# Patient Record
Sex: FEMALE | Race: WHITE | NOT HISPANIC OR LATINO
[De-identification: names, ages, dates, MRNs, and addresses within clinical notes are randomized per-mention and may not be internally consistent; named-entity substitution may affect disease eponyms.]

---

## 2018-04-19 LAB
ABORH_EXT (HISTORICAL CONVERSION): NORMAL
ABORH_EXT (HISTORICAL CONVERSION): NORMAL
ANTIBODY_EXT (HISTORICAL CONVERSION): NEGATIVE
HBSAG_EXT (HISTORICAL CONVERSION): NORMAL
HIV_EXT: NORMAL
RUBELLA_EXT (HISTORICAL CONVERSION): NORMAL

## 2018-06-26 ENCOUNTER — RECORDS - HEALTHEAST (OUTPATIENT)
Dept: ADMINISTRATIVE | Facility: OTHER | Age: 33
End: 2018-06-26

## 2018-06-26 LAB
HGB_EXT (HISTORICAL CONVERSION): 12.6
PLT_EXT - HISTORICAL: 211
RPR - HISTORICAL: NORMAL

## 2018-07-03 ENCOUNTER — RECORDS - HEALTHEAST (OUTPATIENT)
Dept: ADMINISTRATIVE | Facility: OTHER | Age: 33
End: 2018-07-03

## 2018-07-06 ENCOUNTER — RECORDS - HEALTHEAST (OUTPATIENT)
Dept: ADMINISTRATIVE | Facility: OTHER | Age: 33
End: 2018-07-06

## 2018-07-09 ENCOUNTER — COMMUNICATION - HEALTHEAST (OUTPATIENT)
Dept: ADMINISTRATIVE | Facility: CLINIC | Age: 33
End: 2018-07-09

## 2018-07-24 ENCOUNTER — OFFICE VISIT - HEALTHEAST (OUTPATIENT)
Dept: EDUCATION SERVICES | Facility: CLINIC | Age: 33
End: 2018-07-24

## 2018-07-24 DIAGNOSIS — O24.410 DIET CONTROLLED GESTATIONAL DIABETES MELLITUS (GDM) IN THIRD TRIMESTER: ICD-10-CM

## 2018-07-31 ENCOUNTER — AMBULATORY - HEALTHEAST (OUTPATIENT)
Dept: EDUCATION SERVICES | Facility: CLINIC | Age: 33
End: 2018-07-31

## 2018-07-31 DIAGNOSIS — O24.410 DIET CONTROLLED GESTATIONAL DIABETES MELLITUS (GDM) IN THIRD TRIMESTER: ICD-10-CM

## 2018-08-06 ENCOUNTER — AMBULATORY - HEALTHEAST (OUTPATIENT)
Dept: EDUCATION SERVICES | Facility: CLINIC | Age: 33
End: 2018-08-06

## 2018-08-06 DIAGNOSIS — O24.414 INSULIN CONTROLLED GESTATIONAL DIABETES MELLITUS (GDM) IN THIRD TRIMESTER: ICD-10-CM

## 2018-08-07 ENCOUNTER — COMMUNICATION - HEALTHEAST (OUTPATIENT)
Dept: ENDOCRINOLOGY | Facility: CLINIC | Age: 33
End: 2018-08-07

## 2018-08-07 DIAGNOSIS — O24.419 GESTATIONAL DIABETES: ICD-10-CM

## 2018-08-16 ENCOUNTER — OFFICE VISIT - HEALTHEAST (OUTPATIENT)
Dept: ENDOCRINOLOGY | Facility: CLINIC | Age: 33
End: 2018-08-16

## 2018-08-16 DIAGNOSIS — O24.414 INSULIN CONTROLLED GESTATIONAL DIABETES MELLITUS (GDM) IN THIRD TRIMESTER: ICD-10-CM

## 2018-08-30 ENCOUNTER — OFFICE VISIT - HEALTHEAST (OUTPATIENT)
Dept: ENDOCRINOLOGY | Facility: CLINIC | Age: 33
End: 2018-08-30

## 2018-08-30 DIAGNOSIS — O24.414 INSULIN CONTROLLED GESTATIONAL DIABETES MELLITUS (GDM) IN THIRD TRIMESTER: ICD-10-CM

## 2018-08-30 LAB — GBS_EXT (HISTORICAL CONVERSION): POSITIVE

## 2018-09-04 ENCOUNTER — HOSPITAL ENCOUNTER (OUTPATIENT)
Dept: OBGYN | Facility: HOSPITAL | Age: 33
Discharge: HOME | End: 2018-09-04
Attending: OBSTETRICS & GYNECOLOGY | Admitting: OBSTETRICS & GYNECOLOGY

## 2018-09-04 LAB
ALBUMIN UR-MCNC: NEGATIVE MG/DL
AMORPH CRY #/AREA URNS HPF: ABNORMAL /[HPF]
APPEARANCE UR: ABNORMAL
BACTERIA #/AREA URNS HPF: ABNORMAL HPF
BILIRUB UR QL STRIP: NEGATIVE
COLOR UR AUTO: YELLOW
GLUCOSE BLDC GLUCOMTR-MCNC: 154 MG/DL
GLUCOSE UR STRIP-MCNC: ABNORMAL MG/DL
HGB UR QL STRIP: NEGATIVE
KETONES UR STRIP-MCNC: NEGATIVE MG/DL
LEUKOCYTE ESTERASE UR QL STRIP: NEGATIVE
MUCOUS THREADS #/AREA URNS LPF: ABNORMAL LPF
NITRATE UR QL: NEGATIVE
PH UR STRIP: 7 [PH] (ref 4.5–8)
RBC #/AREA URNS AUTO: ABNORMAL HPF
RUPTURE OF FETAL MEMBRANES BY ROM PLUS: NEGATIVE
SP GR UR STRIP: 1.01 (ref 1–1.03)
SQUAMOUS #/AREA URNS AUTO: ABNORMAL LPF
UROBILINOGEN UR STRIP-ACNC: ABNORMAL
WBC #/AREA URNS AUTO: ABNORMAL HPF
WBC CLUMPS #/AREA URNS HPF: PRESENT /[HPF]

## 2018-09-04 ASSESSMENT — MIFFLIN-ST. JEOR: SCORE: 1740.08

## 2019-11-28 ENCOUNTER — RECORDS - HEALTHEAST (OUTPATIENT)
Dept: ADMINISTRATIVE | Facility: OTHER | Age: 34
End: 2019-11-28

## 2019-11-28 LAB — HBA1C MFR BLD: 5.2 % (ref 0–5.6)

## 2020-04-30 ENCOUNTER — RECORDS - HEALTHEAST (OUTPATIENT)
Dept: ADMINISTRATIVE | Facility: OTHER | Age: 35
End: 2020-04-30

## 2020-05-01 ENCOUNTER — RECORDS - HEALTHEAST (OUTPATIENT)
Dept: ADMINISTRATIVE | Facility: OTHER | Age: 35
End: 2020-05-01

## 2020-05-04 ENCOUNTER — COMMUNICATION - HEALTHEAST (OUTPATIENT)
Dept: ADMINISTRATIVE | Facility: CLINIC | Age: 35
End: 2020-05-04

## 2020-05-08 ENCOUNTER — RECORDS - HEALTHEAST (OUTPATIENT)
Dept: HEALTH INFORMATION MANAGEMENT | Facility: CLINIC | Age: 35
End: 2020-05-08

## 2020-05-12 ENCOUNTER — AMBULATORY - HEALTHEAST (OUTPATIENT)
Dept: EDUCATION SERVICES | Facility: CLINIC | Age: 35
End: 2020-05-12

## 2020-05-12 DIAGNOSIS — O24.419 GESTATIONAL DIABETES: ICD-10-CM

## 2020-05-14 ENCOUNTER — COMMUNICATION - HEALTHEAST (OUTPATIENT)
Dept: ADMINISTRATIVE | Facility: CLINIC | Age: 35
End: 2020-05-14

## 2020-05-14 DIAGNOSIS — O24.419 GESTATIONAL DIABETES: ICD-10-CM

## 2020-05-14 RX ORDER — GLUCOSAMINE HCL/CHONDROITIN SU 500-400 MG
1 CAPSULE ORAL 4 TIMES DAILY
Qty: 150 STRIP | Refills: 3 | Status: SHIPPED | OUTPATIENT
Start: 2020-05-14

## 2020-05-18 ENCOUNTER — AMBULATORY - HEALTHEAST (OUTPATIENT)
Dept: EDUCATION SERVICES | Facility: CLINIC | Age: 35
End: 2020-05-18

## 2020-05-18 ENCOUNTER — COMMUNICATION - HEALTHEAST (OUTPATIENT)
Dept: ADMINISTRATIVE | Facility: CLINIC | Age: 35
End: 2020-05-18

## 2020-05-18 DIAGNOSIS — O24.414 INSULIN CONTROLLED GESTATIONAL DIABETES MELLITUS (GDM) IN THIRD TRIMESTER: ICD-10-CM

## 2020-05-27 ENCOUNTER — AMBULATORY - HEALTHEAST (OUTPATIENT)
Dept: EDUCATION SERVICES | Facility: CLINIC | Age: 35
End: 2020-05-27

## 2020-05-27 ENCOUNTER — OFFICE VISIT - HEALTHEAST (OUTPATIENT)
Dept: ENDOCRINOLOGY | Facility: CLINIC | Age: 35
End: 2020-05-27

## 2020-05-27 DIAGNOSIS — O24.414 INSULIN CONTROLLED GESTATIONAL DIABETES MELLITUS (GDM) IN THIRD TRIMESTER: ICD-10-CM

## 2020-06-07 ENCOUNTER — COMMUNICATION - HEALTHEAST (OUTPATIENT)
Dept: SCHEDULING | Facility: CLINIC | Age: 35
End: 2020-06-07

## 2020-06-08 ENCOUNTER — AMBULATORY - HEALTHEAST (OUTPATIENT)
Dept: ENDOCRINOLOGY | Facility: CLINIC | Age: 35
End: 2020-06-08

## 2020-06-08 DIAGNOSIS — O24.414 INSULIN CONTROLLED GESTATIONAL DIABETES MELLITUS (GDM) IN THIRD TRIMESTER: ICD-10-CM

## 2020-06-08 RX ORDER — INSULIN DETEMIR 100 [IU]/ML
INJECTION, SOLUTION SUBCUTANEOUS
Qty: 15 ML | Refills: 5 | Status: SHIPPED | OUTPATIENT
Start: 2020-06-08

## 2020-06-10 ENCOUNTER — OFFICE VISIT - HEALTHEAST (OUTPATIENT)
Dept: ENDOCRINOLOGY | Facility: CLINIC | Age: 35
End: 2020-06-10

## 2020-06-10 DIAGNOSIS — O24.414 INSULIN CONTROLLED GESTATIONAL DIABETES MELLITUS (GDM) IN THIRD TRIMESTER: ICD-10-CM

## 2020-06-24 ENCOUNTER — OFFICE VISIT - HEALTHEAST (OUTPATIENT)
Dept: ENDOCRINOLOGY | Facility: CLINIC | Age: 35
End: 2020-06-24

## 2020-06-24 ENCOUNTER — AMBULATORY - HEALTHEAST (OUTPATIENT)
Dept: ENDOCRINOLOGY | Facility: CLINIC | Age: 35
End: 2020-06-24

## 2020-06-24 DIAGNOSIS — O24.414 INSULIN CONTROLLED GESTATIONAL DIABETES MELLITUS (GDM) IN THIRD TRIMESTER: ICD-10-CM

## 2020-06-30 ENCOUNTER — AMBULATORY - HEALTHEAST (OUTPATIENT)
Dept: OBGYN | Facility: CLINIC | Age: 35
End: 2020-06-30

## 2020-06-30 DIAGNOSIS — Z3A.37 37 WEEKS GESTATION OF PREGNANCY: ICD-10-CM

## 2020-07-01 ENCOUNTER — AMBULATORY - HEALTHEAST (OUTPATIENT)
Dept: FAMILY MEDICINE | Facility: CLINIC | Age: 35
End: 2020-07-01

## 2020-07-01 DIAGNOSIS — Z3A.37 37 WEEKS GESTATION OF PREGNANCY: ICD-10-CM

## 2020-07-03 ASSESSMENT — MIFFLIN-ST. JEOR: SCORE: 1780.9

## 2020-07-04 ENCOUNTER — SURGERY - HEALTHEAST (OUTPATIENT)
Dept: SURGERY | Facility: HOSPITAL | Age: 35
End: 2020-07-04

## 2020-07-04 ENCOUNTER — ANESTHESIA - HEALTHEAST (OUTPATIENT)
Dept: SURGERY | Facility: HOSPITAL | Age: 35
End: 2020-07-04

## 2021-06-01 ENCOUNTER — RECORDS - HEALTHEAST (OUTPATIENT)
Dept: ADMINISTRATIVE | Facility: CLINIC | Age: 36
End: 2021-06-01

## 2021-06-01 VITALS — WEIGHT: 226.3 LBS

## 2021-06-01 VITALS — WEIGHT: 224.6 LBS

## 2021-06-01 VITALS — WEIGHT: 225.7 LBS

## 2021-06-01 VITALS — WEIGHT: 224.9 LBS

## 2021-06-02 VITALS — BODY MASS INDEX: 37.99 KG/M2 | HEIGHT: 65 IN | WEIGHT: 228 LBS

## 2021-06-02 VITALS — WEIGHT: 229 LBS

## 2021-06-04 VITALS — HEIGHT: 65 IN | BODY MASS INDEX: 39.49 KG/M2 | WEIGHT: 237 LBS

## 2021-06-07 NOTE — TELEPHONE ENCOUNTER
[facility] - [place] Women s care   Referring Provider: Lamar Mireles  DX: GDM     Ref./rec. Were received on... (5/1/2020 3:36pm inside DM Fax Folder) records need to be printed.

## 2021-06-07 NOTE — TELEPHONE ENCOUNTER
Date: 5/12/2020 Status: Charla   Time: 2:00 PM Length: 60   Visit Type: TELEPHONE VISIT NEW [2801] Copay: $0.00   Provider: Ketty Waite, Diabetes Ed Department: [facility] DIABETES EDUCATION   Referring Provider: HECTOR JARAMILLO CSN: [id number]   Notes: Phone, call: [phone], Referring Provider: Lamar Mireles  DX: GDM      If any RXs are to be sent, pharmacy on file has been confirmed with patient. Please call pt to confirm if anything is sent over.

## 2021-06-08 NOTE — TELEPHONE ENCOUNTER
I called LM for the pt to c/b to inform the RX has been adjusted. Please inform the pt when she calls back.

## 2021-06-08 NOTE — PROGRESS NOTES
"Mary Dudley is a 34 y.o. female who is being evaluated via a billable video visit.      The patient has been notified of following:     \"This video visit will be conducted via a call between you and your physician/provider. We have found that certain health care needs can be provided without the need for an in-person physical exam.  This service lets us provide the care you need with a video conversation.  If a prescription is necessary we can send it directly to your pharmacy.  If lab work is needed we can place an order for that and you can then stop by our lab to have the test done at a later time.    Video visits are billed at different rates depending on your insurance coverage. Please reach out to your insurance provider with any questions.    If during the course of the call the physician/provider feels a video visit is not appropriate, you will not be charged for this service.\"    Patient has given verbal consent to a Video visit? Yes    Patient would like to receive their AVS by AVS Preference: Evelyn.    Patient would like the video invitation sent by: Text to cell phone: [phone]    Will anyone else be joining your video visit? No        Video Start Time: 1332    Additional provider notes:     [facility]  ENDOCRINOLOGY    Gestational Diabetes 2020    Mary Dudley, 1985, [id number]          Reason for visit      1. Insulin controlled gestational diabetes mellitus (GDM) in third trimester        HPI     Mary Dudley is a very pleasant 34 y.o. old female who presents for GESTATIONAL Diabetes Mellitus.  She is currently 32w2d  pregnant . Due date is 2020   Diagnosed with GDM based on an OGTT. She hashad  GDM in prior pregnancies. Last baby weighed 7Lb 9 oz  Current carbohydrate intake:consistent with recommendations of 30g-60g-60g.  I have reviewed her blood glucose logs and note that the:  Fasting readings  are:above range on current regimen  Postprandial readings are:in range " on current regimen  Current Levemir dose: 14  Current Prandial insulin:0   Blood glucose logs/meter brought in and data reviewed and incorporated into decision-making.  Planned delivery at: [facility]  OBGYN: Azeem    Therapy/Interventions in the past:  She has been seen by the Diabetes Educator- and has received instruction on carbohydrate counting and  consistency.  Records from referring provider and other sources have also been reviewed and incorporated into decision-making.      TODAY:    Laina is being contacted today via Video Visit after starting insulin for GDM. We are joined by SHERIN Centeno.  This is not her first [place].  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 Of the numbers that she has provided, her FBS continue to be elevated. Her PP readings are good. We will increase her dosage by 4 units this evening. Baby is moving appropriately and she is having no swelling.      Blood Sugar Readings:    5-20  F 107    5-21  F 102    5-22  F 95  B 111  D 112    5-23  F 114  B 111    5-24  F 108  L 121    5-25  F 97    5-26  F 103  L 116    5-27  F 106  B 126      Past Medical History       Patient Active Problem List   Diagnosis     Anxiety state     Blood type, Rh negative     Cannabis dependence in remission (H)     Cervical cancer screening     Depressive disorder     Need for rhogam due to Rh negative mother     Obesity in pregnancy     Tobacco use complicating pregnancy        Past Surgical History     No past surgical history on file.    Family History     No family history on file.    Social History     Social History     Tobacco Use     Smoking status: Former Smoker     Smokeless tobacco: Current User   Substance Use Topics     Alcohol use: No     Drug use: No       Review of Systems     Patient has no polyuria or polydipsia, no chest pain, dyspnea or TIA's, no numbness, tingling or pain in extremities  Remainder  negative except as noted in HPI.      Vital Signs     There were no vitals taken for this visit.  Wt Readings from Last 3 Encounters:   18 (!) 228 lb (103.4 kg)   18 (!) 228 lb (103.4 kg)   18 (!) 229 lb (103.9 kg)           Assessment     1. Insulin controlled gestational diabetes mellitus (GDM) in third trimester        Plan     1. GESTATIONAL DIABETES-  Adjust dose as follows:    -Levemir insulin 18   units. Increase by 2 units every 2 days to keep fasting blood glucose below 95mg/dL  -Novolog 0  units with breakfast  -Novolog 0 units with lunch   -Novolog 0 units with dinner  -Increase by 2 units every 2 days to keep 1 hour after meal blood glucose less than 140mg/dL    We reviewed glucose goals of fasting blood glucose <95 mg/dL and 1 hour post prandial blood glucose of <140 mg/dL.    Monitor blood sugar 4 times daily: Fasting  and 1 hour after each meal.  Contact  this clinic [phone] if blood glucose is not within the above-mentioned goals.     We discussed the importance of excellent glycemic control during pregnancy to limit complications such as fetal macrosomia, shoulder dystocia,  hypoglycemia and hyperbilirubinemia.  I have discussed the patient's increased risk of recurrent GDM and/or development of type 2 diabetes later in life.        Pt will f/u in 2 weeks.           Lab Results     Hemoglobin A1c_EXT   Date Value Ref Range Status   2019 5.2 <5.7 % Final       No results found for: CHOL, HDL, LDLCALC, TRIG    No results found for: ALT, AST, GGT, ALKPHOS, BILITOT      Current Medications     Outpatient Medications Prior to Visit   Medication Sig Dispense Refill     acetone, urine, test Strp Test once a day 100 strip 0     blood glucose test (ACCU-CHEK GUIDE) strips Use 1 each As Directed 4 (four) times a day. 150 strip 3     blood-glucose meter Misc Please dispense Accu Check Guide meter and testing supplies or any meter + testing supplies per insurance formualry  "1 each 0     generic lancets Use 1 each As Directed 3 (three) times a day. 100 each 6     insulin detemir U-100 (LEVEMIR FLEXTOUCH U-100 INSULN) 100 unit/mL (3 mL) pen Inject 8 Units under the skin at bedtime. 5 adj dose pen 1     pen needle, diabetic (BD ULTRA-FINE JOIE PEN NEEDLE) 32 gauge x 5/32\" Ndle Use 1 each As Directed daily. 100 each 2     PNV no.95/ferrous fum/folic ac (PRENATAL ORAL) Take by mouth.       clindamycin (CLEOCIN T) 1 % lotion Apply topically.       docusate sodium (COLACE) 50 MG capsule Take 50 mg by mouth.       hydrocortisone 1 % cream Apply to inflamed skin 2-3 times daily until clear but not more than 2 weeks       No facility-administered medications prior to visit.            Video-Visit Details    Type of service:  Video Visit    Video End Time (time video stopped): 1350  Originating Location (pt. Location): Home    Distant Location (provider location):  [place] [facility]     Platform used for Video Visit: Tessa VELEZ      "

## 2021-06-08 NOTE — TELEPHONE ENCOUNTER
Pt states that pharm told her there is still a problem. (not sure if they got the notice about RX instructions being change, etc.)

## 2021-06-08 NOTE — TELEPHONE ENCOUNTER
"Patient calling reporting she is unable to refill her Levemir Insulin due to current dosing higher then prescribed 8 units  as insurance will not cover her.   Patient reporting she is currently taking 28 units at bedtime and still moving up dosage at this time. Stating pharmacy is advising listing 28 units as dose with a maximum dose.     Attempted to contact [employer] [facility] to request Endocrine on call and was advised no one is listed on call. Patient will contact OB/GYN provider on call now and/or pay for a dose out of pocket for this evening.     Patient will contact diabetic education clinic in morning. Denies further symptoms.      Laina Johnston RN  [facility]    Reason for Disposition    [1] Request for URGENT new prescription or refill of \"essential\" medication (i.e., likelihood of harm to patient if not taken) AND [2] triager unable to fill per unit policy    Additional Information    Negative: Drug overdose and nurse unable to answer question    Negative: Caller requesting information not related to medicine    Negative: Caller requesting a prescription for Strep throat and has a positive culture result    Negative: Rash while taking a medication or within 3 days of stopping it    Negative: Immunization reaction suspected    Negative: [1] Asthma and [2] having symptoms of asthma (cough, wheezing, etc)    Negative: MORE THAN A DOUBLE DOSE of a prescription or over-the-counter (OTC) drug    Negative: [1] DOUBLE DOSE (an extra dose or lesser amount) of over-the-counter (OTC) drug AND [2] any symptoms (e.g., dizziness, nausea, pain, sleepiness)    Negative: [1] DOUBLE DOSE (an extra dose or lesser amount) of prescription drug AND [2] any symptoms (e.g., dizziness, nausea, pain, sleepiness)    Negative: Took another person's prescription drug    Protocols used: MEDICATION QUESTION CALL-A-      "

## 2021-06-08 NOTE — PROGRESS NOTES
"Mary Dudley is a 34 y.o. female who is being evaluated via a billable video visit.      The patient has been notified of following:     \"This video visit will be conducted via a call between you and your physician/provider. We have found that certain health care needs can be provided without the need for an in-person physical exam.  This service lets us provide the care you need with a video conversation.  If a prescription is necessary we can send it directly to your pharmacy.  If lab work is needed we can place an order for that and you can then stop by our lab to have the test done at a later time.    Video visits are billed at different rates depending on your insurance coverage. Please reach out to your insurance provider with any questions.    If during the course of the call the physician/provider feels a video visit is not appropriate, you will not be charged for this service.\"    Patient has given verbal consent to a Video visit? Yes    Patient would like to receive their AVS by AVS Preference: Evelyn.    Patient would like the video invitation sent by: Text to cell phone: [phone]    Will anyone else be joining your video visit? No        Video Start Time: 1318    Additional provider notes:      Reason for visit      1. Insulin controlled gestational diabetes mellitus (GDM) in third trimester        HPI     Mary Dduley is a very pleasant 34 y.o. old female who presents for GESTATIONAL Diabetes Mellitus.  She is currently 32w2d  pregnant . Due date is 2020   Diagnosed with GDM based on an OGTT. She hashad  GDM in prior pregnancies. Last baby weighed 7Lb 9 oz  Current carbohydrate intake:consistent with recommendations of 30g-60g-60g.  I have reviewed her blood glucose logs and note that the:  Fasting readings  are:above range on current regimen  Postprandial readings are:in range on current regimen  Current Levemir dose: 32  Current Prandial insulin:0   Blood glucose logs/meter brought " in and data reviewed and incorporated into decision-making.  Planned delivery at: [facility]  OBGYN: Azeem    Therapy/Interventions in the past:  She has been seen by the Diabetes Educator- and has received instruction on carbohydrate counting and  consistency.  Records from referring provider and other sources have also been reviewed and incorporated into decision-making.      TODAY:  Mary is contacted today via Video Visit in f/u for GDM. We are joined by SHERIN Palomino. She has provided her BG for us and her FBS continue to be quite elevated. We will increase by 6 units tonight. She is having difficulty eating dinner earlier enough in the evening and it often falls between 8 and 10 PM. If she eats late, she won't have a snack. If she is working, she will get up around 5 in the morning. Less sleep is also contributory to problems with FBS control. Baby is moving appropriately and is passing NSTs. Pt is having some swelling in her L ankle only, but has a hx of injury in it.     Blood Sugar Readings:    6-3  F 99  B 113    6-4  F 114  B 115  L 127  D 145    6-5  F 101  L 134  D 185    6-6  F 99  L 70  D 183    6-7  F 103  B 103  L 88  D 94    6-8  F 103  B 131  L 125    6-9  F 107  L 153  D 119    6-10  F 114  B 111    Ketones for this week: negative        Past Medical History       Patient Active Problem List   Diagnosis     Anxiety state     Blood type, Rh negative     Cannabis dependence in remission (H)     Cervical cancer screening     Depressive disorder     Need for rhogam due to Rh negative mother     Obesity in pregnancy     Tobacco use complicating pregnancy        Past Surgical History     No past surgical history on file.    Family History     No family history on file.    Social History     Social History     Tobacco Use     Smoking status: Former Smoker     Smokeless tobacco: Current User   Substance Use Topics     Alcohol use: No     Drug use: No       Review of Systems     Patient has no polyuria  or polydipsia, no chest pain, dyspnea or TIA's, no numbness, tingling or pain in extremities  Remainder negative except as noted in HPI.      Vital Signs     There were no vitals taken for this visit.  Wt Readings from Last 3 Encounters:   18 (!) 228 lb (103.4 kg)   18 (!) 228 lb (103.4 kg)   18 (!) 229 lb (103.9 kg)           Assessment     1. Insulin controlled gestational diabetes mellitus (GDM) in third trimester        Plan     1. GESTATIONAL DIABETES-  Adjust dose as follows:     -Levemir insulin 38   units. Increase by 2 units every 2 days to keep fasting blood glucose below 95mg/dL  -Novolog 0  units with breakfast  -Novolog 0 units with lunch   -Novolog 0 units with dinner  -Increase by 2 units every 2 days to keep 1 hour after meal blood glucose less than 140mg/dL     We reviewed glucose goals of fasting blood glucose <95 mg/dL and 1 hour post prandial blood glucose of <140 mg/dL.    Monitor blood sugar 4 times daily: Fasting  and 1 hour after each meal.  Contact  this clinic [phone] if blood glucose is not within the above-mentioned goals.      We discussed the importance of excellent glycemic control during pregnancy to limit complications such as fetal macrosomia, shoulder dystocia,  hypoglycemia and hyperbilirubinemia.  I have discussed the patient's increased risk of recurrent GDM and/or development of type 2 diabetes later in life.          Pt will f/u in 1 weeks.             Lab Results     Hemoglobin A1c_EXT   Date Value Ref Range Status   2019 5.2 <5.7 % Final       No results found for: CHOL, HDL, LDLCALC, TRIG    No results found for: ALT, AST, GGT, ALKPHOS, BILITOT      Current Medications     Outpatient Medications Prior to Visit   Medication Sig Dispense Refill     acetone, urine, test Strp Test once a day 100 strip 0     blood glucose test (ACCU-CHEK GUIDE) strips Use 1 each As Directed 4 (four) times a day. 150 strip 3     blood-glucose meter Misc  "Please dispense Accu Check Guide meter and testing supplies or any meter + testing supplies per insurance formualry 1 each 0     generic lancets Use 1 each As Directed 3 (three) times a day. 100 each 6     insulin detemir U-100 (LEVEMIR FLEXTOUCH U-100 INSULN) 100 unit/mL (3 mL) pen Take as directed (titrating), up to 50 units a day 15 mL 5     pen needle, diabetic (BD ULTRA-FINE JOIE PEN NEEDLE) 32 gauge x 5/32\" Ndle Use 1 each As Directed daily. 100 each 2     PNV no.95/ferrous fum/folic ac (PRENATAL ORAL) Take by mouth.       No facility-administered medications prior to visit.        Video-Visit Details    Type of service:  Video Visit    Video End Time (time video stopped): 1336  Originating Location (pt. Location): Home    Distant Location (provider location):  [place] [facility]     Platform used for Video Visit: Tessa VELEZ      "

## 2021-06-08 NOTE — PROGRESS NOTES
"RANJIT GDM Care Plan      Assessment/Plan: telephone visit today. Spoke with pt over the phone. She is up to 14 units at HS. Will increase to 18 units tonight and continue to increase by +2 every 2 FBG >95. Reviewed signs and symptoms of hypoglycemia and treatment. 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 She will work on getting more pp readings.   Pt will call with any concerns prior to f/u.     5-20  F 107     5-21  F 102     5-22  F 95  B 111  D 112     5-23  F 114  B 111     5-24  F 108  L 121     5-25  F 97     5-26  F 103  L 116     5-27  F 106  B 126      Time: 20  Visit Type: telephone visit: pregnancy clinic   Provider: Liz Gann CNM   Provider's Diagnosis (per referral form): Gestational (648.83)  OGTT: No results found for this or any previous visit.   Estimated Date of Delivery: 7/20  Pregnancy #: 4  Previous GDM: Yes   Medications:   Current Outpatient Medications:      acetone, urine, test Strp, Test once a day, Disp: 100 strip, Rfl: 0     blood glucose test (ACCU-CHEK GUIDE) strips, Use 1 each As Directed 4 (four) times a day., Disp: 150 strip, Rfl: 3     blood-glucose meter Misc, Please dispense Accu Check Guide meter and testing supplies or any meter + testing supplies per insurance formualry, Disp: 1 each, Rfl: 0     clindamycin (CLEOCIN T) 1 % lotion, Apply topically., Disp: , Rfl:      docusate sodium (COLACE) 50 MG capsule, Take 50 mg by mouth., Disp: , Rfl:      generic lancets, Use 1 each As Directed 3 (three) times a day., Disp: 100 each, Rfl: 6     hydrocortisone 1 % cream, Apply to inflamed skin 2-3 times daily until clear but not more than 2 weeks, Disp: , Rfl:      insulin detemir U-100 (LEVEMIR FLEXTOUCH U-100 INSULN) 100 unit/mL (3 mL) pen, Inject 8 Units under the skin at bedtime., Disp: 5 adj dose pen, Rfl: 1     pen needle, diabetic (BD ULTRA-FINE JOIE PEN NEEDLE) 32 gauge x 5/32\" Ndle, Use 1 each As Directed daily., Disp: " 100 each, Rfl: 2      BG monitoring goals: Fasting <95; 1 hour post start of meal <140. Test 4 x per day.  Check fasting a.m. ketones: Yes  GDM meal pattern/carb counting taught per guidelines: Yes    Past Goals:  Nutrition: GDM meal plan MET  Activity: Walking after meals when able/staying active MET  Monitoring: BG 4x/day as directed, ketones every morning MET      New Goals:  Nutrition: GDM meal plan   Activity: Walking after meals when able/staying active   Monitoring: BG 4x/day as directed, ketones every morning    DIABETES CARE PLAN AND EDUCATION RECORD    Gestational Diabetes Disease Process/Preconception Care/Management During Pregnancy/Postpartum:Discussed    Meter (per above goals): Discussed    Nutrition Management    Weight: Assessed and Discussed  Portions/Balance: Assessed and Discussed  Carb ID/Count: Assessed and Discussed  Label Reading: Assessed and Discussed  Menu Planning: Assessed and Discussed  Dining Out: Assessed and Discussed  Physical Activity: Assessed and Discussed    Acute Complications: Prevent, Detect, Treat:    Goal Setting and Problem Solving: Assessed and Discussed  Barriers: Assessed and Discussed  Psychosocial Adjustments: Assessed and Discussed

## 2021-06-08 NOTE — TELEPHONE ENCOUNTER
I called the pharmacy and advised that they will need to do a dose over-ride for the pt. They will do this and get the RX ready for the pt. Pt informed.

## 2021-06-08 NOTE — TELEPHONE ENCOUNTER
Diabetes educators    Patient states she received all RXs but:blood glucose test (ACCU-CHEK GUIDE) strips     Please resend RX or check with pharm on why it isnt there.    Please send to: [id number] - [place] - [address] AT [place] [id number]

## 2021-06-08 NOTE — TELEPHONE ENCOUNTER
Gestational patient calling with 3 high fasting readings. Please advise on what to do.     Mary @ [phone]

## 2021-06-08 NOTE — TELEPHONE ENCOUNTER
Returned call to patient regarding high BG   Fasting numbers have been 107, 105, 96, 113, 97  Based upon GDM policy and protocol for high fasting BG in pregnancy insulin is indicated. Patient was then instructed to    begin with 8 units  SC at HS and increase by 2 units every other day until fasting numbers are <95 mg/dL - then remain at that dose  Patient stated she used insulin with her 2 previous pregnancies so felt comfortable with the administration.  Will have scheduling contact her to get her into PC  Used Levemir with last pregnancy

## 2021-06-09 NOTE — PROGRESS NOTES
"Mary Dudley is a 34 y.o. female who is being evaluated via a billable video visit.      The patient has been notified of following:     \"This video visit will be conducted via a call between you and your physician/provider. We have found that certain health care needs can be provided without the need for an in-person physical exam.  This service lets us provide the care you need with a video conversation.  If a prescription is necessary we can send it directly to your pharmacy.  If lab work is needed we can place an order for that and you can then stop by our lab to have the test done at a later time.    Video visits are billed at different rates depending on your insurance coverage. Please reach out to your insurance provider with any questions.    If during the course of the call the physician/provider feels a video visit is not appropriate, you will not be charged for this service.\"    Patient has given verbal consent to a Video visit? Yes    How would you like to obtain your AVS? AVS Preference: [employer].  Patient would like the video invitation sent by: phone    Will anyone else be joining your video visit? No        Video Start Time: 1342    Additional provider notes:      Reason for visit      1. Insulin controlled gestational diabetes mellitus (GDM) in third trimester        HPI     Mary Dudley is a very pleasant 34 y.o. old female who presents for GESTATIONAL Diabetes Mellitus.  She is currently 36w2d  pregnant . Due date is 2020   Diagnosed with GDM based on an OGTT. She hashad  GDM in prior pregnancies. Last baby weighed 7Lb 9 oz  Current carbohydrate intake:consistent with recommendations of 30g-60g-60g.  I have reviewed her blood glucose logs and note that the:  Fasting readings  are:above range on current regimen  Postprandial readings are:in range on current regimen  Current Levemir dose: 56  Current Prandial insulin:0   Blood glucose logs/meter brought in and data reviewed and " incorporated into decision-making.  Planned delivery at: [facility]  OBGYN: Aze[facility]  Therapy/Interventions in the past:  She has been seen by the Diabetes Educator- and has received instruction on carbohydrate counting and  consistency.  Records from referring provider and other sources have also been reviewed and incorporated into decision-making.      TODAY:    Mary is contacted today in f/u for GDM. This is her last appointment prior to Delivery. They will likely be inducing in a little over a week. We are joined today by SHERIN Palomino. She has provided BG today and continues to have some slightly higher numbers in her FBS. We will increase by 2 units today.  She reports that a higher CHO meal will increase her PP readings. She is experiencing Contractions. Baby is moving appropriately and she is having more swelling in her ankles. Postpartum instructions given and all questions answered to her satisfaction. Instructed not to take her insulin the night before they induce.       Blood Sugar Readings:    6-17  F 108  B 118  Ketone: neg    6-18  F 102  B 124  D 155  Ketone: neg    6-19  F 108  L 85  D 108  Ketone: neg    6-20  F 86  L 147  D 121  Ketone: trace    6-21  F 98  B 113  Ketone: neg    6-22  F 106  L 106  D 138  Ketone: neg    6-23  F 103  B 115  L 130  D 125  Ketone: neg    6-24  F 93      Past Medical History       Patient Active Problem List   Diagnosis     Anxiety state     Blood type, Rh negative     Cannabis dependence in remission (H)     Cervical cancer screening     Depressive disorder     Need for rhogam due to Rh negative mother     Obesity in pregnancy     Tobacco use complicating pregnancy        Past Surgical History     No past surgical history on file.    Family History     No family history on file.    Social History     Social History     Tobacco Use     Smoking status: Former Smoker     Smokeless tobacco: Current User   Substance Use Topics     Alcohol use: No     Drug use: No        Review of Systems     Patient has no polyuria or polydipsia, no chest pain, dyspnea or TIA's, no numbness, tingling or pain in extremities  Remainder negative except as noted in HPI.      Vital Signs     There were no vitals taken for this visit.  Wt Readings from Last 3 Encounters:   18 (!) 228 lb (103.4 kg)   18 (!) 228 lb (103.4 kg)   18 (!) 229 lb (103.9 kg)           Assessment     1. Insulin controlled gestational diabetes mellitus (GDM) in third trimester        Plan         1. GESTATIONAL DIABETES-  Adjust dose as follows:     -Levemir insulin 58   units. Increase by 2 units every 2 days to keep fasting blood glucose below 95mg/dL  -Novolog 0  units with breakfast  -Novolog 0 units with lunch   -Novolog 0 units with dinner  -Increase by 2 units every 2 days to keep 1 hour after meal blood glucose less than 140mg/dL     We reviewed glucose goals of fasting blood glucose <95 mg/dL and 1 hour post prandial blood glucose of <140 mg/dL.    Monitor blood sugar 4 times daily: Fasting  and 1 hour after each meal.  Contact  this clinic [phone] if blood glucose is not within the above-mentioned goals.      We discussed the importance of excellent glycemic control during pregnancy to limit complications such as fetal macrosomia, shoulder dystocia,  hypoglycemia and hyperbilirubinemia.  I have discussed the patient's increased risk of recurrent GDM and/or development of type 2 diabetes later in life.         F/up with A1c 3 months postpartum with PCP.    May be a candidate for metformin postpartum as she has more than 1 risk factor for future Type 2 Diabetes Mellitus.    She will need a screening A1c at least annually, TLC- including carbohydrate control and exercise.    After you deliver the baby, it is suggested to check your blood sugar occasionally (1 - 2 times per week) for 6 weeks.     When you are not pregnant, normal blood sugars are:       Fasting (before eating anything in  "the morning)  - under 100 mg/dl    2 hours after meals under 140  The American Diabetes Association recommends:     a glucose tolerance test 6 weeks after you deliver the baby.         a hemoglobin Alc test yearly after delivery.  The Alc test is a 2-3 month average blood sugar reading.        Discuss getting these tests with your provider.       After delivery, and your provider has said that it is safe to be active, work toward getting 150 minutes each week of physical activity to decrease your risk of  getting type 2 diabetes.       Maintaining a healthy body weight will also decrease your risk of getting type 2 diabetes.         Lab Results     Hemoglobin A1c_EXT   Date Value Ref Range Status   11/28/2019 5.2 <5.7 % Final       No results found for: CHOL, HDL, LDLCALC, TRIG    No results found for: ALT, AST, GGT, ALKPHOS, BILITOT      Current Medications     Outpatient Medications Prior to Visit   Medication Sig Dispense Refill     acetone, urine, test Strp Test once a day 100 strip 0     blood glucose test (ACCU-CHEK GUIDE) strips Use 1 each As Directed 4 (four) times a day. 150 strip 3     blood-glucose meter Misc Please dispense Accu Check Guide meter and testing supplies or any meter + testing supplies per insurance formualry 1 each 0     generic lancets Use 1 each As Directed 3 (three) times a day. 100 each 6     insulin detemir U-100 (LEVEMIR FLEXTOUCH U-100 INSULN) 100 unit/mL (3 mL) pen Take as directed (titrating), up to 50 units a day (Patient taking differently: Take as directed (titrating), up to 56 units a day) 15 mL 5     pen needle, diabetic (BD ULTRA-FINE JOIE PEN NEEDLE) 32 gauge x 5/32\" Ndle Use 1 each As Directed daily. 100 each 2     PNV no.95/ferrous fum/folic ac (PRENATAL ORAL) Take by mouth.       No facility-administered medications prior to visit.          Video-Visit Details    Type of service:  Video Visit    Video End Time (time video stopped): 1358  Originating Location (pt. " Location): Home    Distant Location (provider location):  [facility]     Platform used for Video Visit: Tessa

## 2021-06-09 NOTE — ANESTHESIA PREPROCEDURE EVALUATION
Anesthesia Evaluation      Patient summary reviewed     Airway   Mallampati: II  Neck ROM: full   Pulmonary - normal exam   (+) a smoker                         Cardiovascular - negative ROS and normal exam   Neuro/Psych - negative ROS   (+) anxiety/panic attacks,     Endo/Other    (+) obesity,      GI/Hepatic/Renal - negative ROS           Dental - normal exam                        Anesthesia Plan  Planned anesthetic: general endotracheal    ASA 2   Induction: intravenous       Post-op plan: routine recovery

## 2021-06-09 NOTE — ANESTHESIA CARE TRANSFER NOTE
Last vitals:   Vitals:    07/04/20 1000   BP: 122/59   Pulse: 92   Resp: 16   Temp: 36.7  C (98  F)   SpO2: 90%     Patient's level of consciousness is drowsy  Spontaneous respirations: yes  Maintains airway independently: yes  Dentition unchanged: yes  Oropharynx: oropharynx clear of all foreign objects    QCDR Measures:  ASA# 20 - Surgical Safety Checklist: WHO surgical safety checklist completed prior to induction    PQRS# 430 - Adult PONV Prevention: 4558F - Pt received => 2 anti-emetic agents (different classes) preop & intraop  ASA# 8 - Peds PONV Prevention: NA - Not pediatric patient, not GA or 2 or more risk factors NOT present  PQRS# 424 - Dayan-op Temp Management: 4559F - At least one body temp DOCUMENTED => 35.5C or 95.9F within required timeframe  PQRS# 426 - PACU Transfer Protocol: - Transfer of care checklist used  ASA# 14 - Acute Post-op Pain: ASA14B - Patient did NOT experience pain >= 7 out of 10

## 2021-06-09 NOTE — ANESTHESIA POSTPROCEDURE EVALUATION
Patient: Mary Dudley  Procedure(s):  LIGATION, FALLOPIAN TUBE, POSTPARTUM (Bilateral)  Anesthesia type: No value filed.    Patient location: PACU  Last vitals:   Vitals Value Taken Time   /63 7/4/2020 11:12 AM   Temp 36.5  C (97.7  F) 7/4/2020 11:12 AM   Pulse 57 7/4/2020 11:12 AM   Resp 22 7/4/2020 10:52 AM   SpO2 97 % 7/4/2020 11:12 AM   Vitals shown include unvalidated device data.  Post vital signs: stable  Level of consciousness: awake and responds to simple questions  Post-anesthesia pain: pain controlled  Post-anesthesia nausea and vomiting: no  Pulmonary: unassisted, return to baseline  Cardiovascular: stable and blood pressure at baseline  Hydration: adequate  Anesthetic events: no    QCDR Measures:  ASA# 11 - Dayan-op Cardiac Arrest: ASA11B - Patient did NOT experience unanticipated cardiac arrest  ASA# 12 - Dayan-op Mortality Rate: ASA12B - Patient did NOT die  ASA# 13 - PACU Re-Intubation Rate: ASA13B - Patient did NOT require a new airway mgmt  ASA# 10 - Composite Anes Safety: ASA10A - No serious adverse event    Additional Notes:

## 2021-06-09 NOTE — PROGRESS NOTES
[facility] GDM Care Plan      Assessment/Plan: Spoke with Mary via telephone today for her follow-up.  Stated she is doing well.  She is currently taking 56 units of insulin at bedtime.  Blood Sugar Readings:     6-17  F 108  B 118  Ketone: neg     6-18  F 102  B 124  D 155  Ketone: neg     6-19  F 108  L 85  D 108  Ketone: neg     6-20  F 86-high carb snack night before, felt okay in the morning  L 147  D 121  Ketone: trace     6-21  F 98  B 113  Ketone: neg     6-22  F 106  L 106  D 138  Ketone: neg     6-23  F 103  B 115  L 130  D 125  Ketone: neg     6-24  F 93  Increase dose to 58 units starting tonight.  She is scheduled in be induced on 7.03.2020.    Today will be patient's last visit.  Discussed continuing to check glucose 4 times a day and following meal plan until the day of delivery.  Discussed calling before delivery of there is a change in her readings.    After you deliver the baby, it is suggested to check your blood sugar occasionally (1 - 2 times per week) for 6 weeks.   When you are not pregnant, normal blood sugars are:     Fasting (before eating anything in the morning)  - under 100 mg/dl  2 hours after meals under 140  The American Diabetes Association recommends:   a glucose tolerance test 6 weeks after you deliver the baby.      a hemoglobin Alc test yearly after delivery.  The Alc test is a 2-3 month average blood sugar reading.       Discuss getting these tests with your provider.    After delivery, and your provider has said that it is safe to be active, work toward getting 150 minutes each week of physical activity to decrease your risk of  getting type 2 diabetes.    Maintaining a healthy body weight will also decrease your risk of getting type 2 diabetes.       Visit Type: pregnancy clinic   Provider: Azeem  Provider's Diagnosis (per referral form): Gestational (648.83)    Weight:    OGTT: No results found for this or any previous visit.   Estimated Date of Delivery: None noted.   Pregnancy #:  "4  Previous GDM: No   Medications:   Current Outpatient Medications:      acetone, urine, test Strp, Test once a day, Disp: 100 strip, Rfl: 0     blood glucose test (ACCU-CHEK GUIDE) strips, Use 1 each As Directed 4 (four) times a day., Disp: 150 strip, Rfl: 3     blood-glucose meter Misc, Please dispense Accu Check Guide meter and testing supplies or any meter + testing supplies per insurance formualry, Disp: 1 each, Rfl: 0     generic lancets, Use 1 each As Directed 3 (three) times a day., Disp: 100 each, Rfl: 6     insulin detemir U-100 (LEVEMIR FLEXTOUCH U-100 INSULN) 100 unit/mL (3 mL) pen, Take as directed (titrating), up to 50 units a day (Patient taking differently: Take as directed (titrating), up to 56 units a day), Disp: 15 mL, Rfl: 5     pen needle, diabetic (BD ULTRA-FINE JOIE PEN NEEDLE) 32 gauge x 5/32\" Ndle, Use 1 each As Directed daily., Disp: 100 each, Rfl: 2     PNV no.95/ferrous fum/folic ac (PRENATAL ORAL), Take by mouth., Disp: , Rfl:       BG monitoring goals: Fasting <95; 1 hour post start of meal <140. Test 4 x per day.  Check fasting a.m. ketones: Yes  GDM meal pattern/carb counting taught per guidelines: Yes    Past Goals:  Nutrition: GDM meal plan -Met  Activity: Walking after meals when able/staying active -Met  Monitoring: BG 4x/day as directed, ketones every morning -Met    DIABETES CARE PLAN AND EDUCATION RECORD    Gestational Diabetes Disease Process/Preconception Care/Management During Pregnancy/Postpartum:Discussed    Meter (per above goals): Assessed and Discussed    Nutrition Management    Weight: Assessed and Discussed  Portions/Balance: Assessed and Discussed  Carb ID/Count: Assessed and Discussed  Label Reading: Assessed and Discussed  Menu Planning: Assessed and Discussed  Dining Out: Assessed and Discussed  Physical Activity: Assessed and Discussed    Acute Complications: Prevent, Detect, Treat:    Goal Setting and Problem Solving: Assessed and Discussed  Barriers: Assessed " and Discussed  Psychosocial Adjustments: Assessed and Discussed

## 2021-06-16 PROBLEM — O99.330 TOBACCO USE COMPLICATING PREGNANCY: Status: ACTIVE | Noted: 2018-02-08

## 2021-06-16 PROBLEM — O99.210 OBESITY IN PREGNANCY: Status: ACTIVE | Noted: 2018-02-08

## 2021-06-16 PROBLEM — Z67.91 BLOOD TYPE, RH NEGATIVE: Status: ACTIVE | Noted: 2018-02-08

## 2021-06-16 PROBLEM — Z34.90 PREGNANT: Status: ACTIVE | Noted: 2020-07-03

## 2021-06-19 NOTE — PROGRESS NOTES
"[facility] GDM Care Plan    Assessment: pt seen today for f/u. She brings in BG meter. She admits she has missed readings over the weekend as she was stressed and a little busy. She does have 4 FBG readings, all of which are still >95. She did try protein a few days at HS and some exercise in the evenings. However, it did not seem to make any difference with FBG. A couple pp elevations that are explainable. Per protocol, started on 10 units of levemir at HS. Pt was able to demo pen w/o difficulty. Instructed to increase by 2 units every 2 readings >95 fasting. Reviewed signs and symptoms of hypoglycemia and treatment.   Pt will call with any concerns prior to f/u. Otherwise she is scheduled in PC next week 8/16.       Visit Type: GDM Individual Follow-up  Time: 30  Provider: Liz Gann CNM  Provider's Diagnosis (per referral form): Gestational (648.83)  Weight: (!) 224 lb 14.4 oz (102 kg)  OGTT: No results found for this or any previous visit.   Estimated Date of Delivery: 9/18/18  Pregnancy #: 3  Previous GDM: Yes - 8 years ago, on insulin   Medications:   Current Outpatient Prescriptions:      acetone, urine, test (ACETONE, URINE, TEST) Strp, Use 1 each As Directed every morning., Disp: 50 strip, Rfl: 2     blood glucose test strips, Use 1 each As Directed 4 (four) times a day., Disp: 150 strip, Rfl: 3     generic lancets, Use 1 each As Directed 4 (four) times a day., Disp: 100 each, Rfl: 2     insulin detemir U-100 (LEVEMIR FLEXTOUCH U-100 INSULN) 100 unit/mL (3 mL) pen, Inject 10 Units under the skin once daily. At bedtime, Disp: 5 adj dose pen, Rfl: 0     pen needle, diabetic (BD ULTRA-FINE JOIE PEN NEEDLE) 32 gauge x 5/32\" Ndle, Use 1 each As Directed once daily., Disp: 100 each, Rfl: 0    BG monitoring goals: Fasting <95; 1 hour post start of meal <140. Test 4 x per day.  Check fasting a.m. ketones: Yes  GDM meal pattern/carb counting taught per guidelines: Yes    Past Goals:  Nutrition: GDM meal plan MOSTLY MET "   Activity: Walking after meals when able/staying active MOSTLY MET   Monitoring: BG 4x/day as directed, ketones every morning MOSTLY MET       New Goals:  Nutrition: GDM meal plan   Activity: Walking after meals when able/staying active   Monitoring: BG 4x/day as directed, ketones every morning    DIABETES CARE PLAN AND EDUCATION RECORD    Gestational Diabetes Disease Process/Preconception Care/Management During Pregnancy/Postpartum:Discussed    Meter (per above goals): Assessed and Discussed    Nutrition Management    Weight: Assessed and Discussed  Portions/Balance: Assessed and Discussed  Carb ID/Count: Assessed and Discussed  Label Reading: Assessed and Discussed  Menu Planning: Assessed and Discussed  Dining Out: Assessed and Discussed  Physical Activity: Assessed and Discussed    Acute Complications: Prevent, Detect, Treat:    Goal Setting and Problem Solving: Assessed and Discussed  Barriers: Assessed and Discussed  Psychosocial Adjustments: Assessed and Discussed    I agree with the aforementioned diabetes plan.  Jessika Newelltatianna  [facility]  8/7/2018  9:58 AM

## 2021-06-19 NOTE — PROGRESS NOTES
"[facility]  ENDOCRINOLOGY    Gestational Diabetes 2018    Mary Dudley, 1985, [id number]          Reason for visit      1. Insulin controlled gestational diabetes mellitus (GDM) in third trimester        HPI     Mary Dudley is a very pleasant 32 y.o. old female who presents for GESTATIONAL Diabetes Mellitus.  She is currently 35w2d  pregnant . Due date is 18  Diagnosed with GDM based on an OGTT. She hashad  GDM in prior pregnancies.   Current carbohydrate intake:consistent with recommendations of 30g-60g-60g.  I have reviewed her blood glucose logs and note that the:  Fasting readings  are:in range on current regimen  Postprandial readings are:in range on current regimen  Current Basaglar dose: 14 u  Current Prandial insulin: o  Blood glucose logs/meter brought in and data reviewed and incorporated into decision-making.  [facility]    Therapy/Interventions in the past:  She has been seen by the Diabetes Educator- and has received instruction on carbohydrate counting and  consistency.  Records from referring provider and other sources have also been reviewed and incorporated into decision-making.      TODAY:  Mary is here for the first time after starting insulin.  This is not her first [place].  She has had GDM in all of her pregnancies. Both babies were 8+ lbs.  She has brought in her meter and her FBS have improved since adding the insulin.  She has a couple of elevations and sheepishly admits to too much CHO.  She also tested too early one time.  She states that Dr has no concerns - \"everything is fine.\"  Baby is passing BPPs and NSTs.  Moving appropriately and she is having no swelling.    Past Medical History       Patient Active Problem List   Diagnosis     Gestational diabetes        Past Surgical History     History reviewed. No pertinent surgical history.    Family History     History reviewed. No pertinent family history.    Social History     Social " History   Substance Use Topics     Smoking status: None     Smokeless tobacco: None     Alcohol use None       Review of Systems     Patient has no polyuria or polydipsia, no chest pain, dyspnea or TIA's, no numbness, tingling or pain in extremities  Remainder negative except as noted in HPI.      Vital Signs     BP 96/64 (Patient Site: Right Arm, Patient Position: Sitting, Cuff Size: Adult Regular)  Pulse 80  Wt (!) 226 lb 4.8 oz (102.6 kg)  Wt Readings from Last 3 Encounters:   08/16/18 (!) 226 lb 4.8 oz (102.6 kg)   08/06/18 (!) 224 lb 14.4 oz (102 kg)   07/31/18 (!) 224 lb 9.6 oz (101.9 kg)       Physical Exam     GENERAL: Pleasant, alert, appropriate appearance for age. No acute distress,   HEENT: Normocephalic, atraumatic  NECK: Supple, no masses or  lymph nodes.  THYROID: No nodules or enlargement. Non-tender, no bruit  CHEST/RESPIRATORY: Normal chest wall and respirations. Clear to auscultation.  CARDIOVASCULAR: Regular rate and rhythm. S1, S2, no murmur, click, gallop, or rubs.  ABDOMEN: Gravid   LYMPHATIC: No palpable nodes in neck, supraclavicular,  SKIN: No melanosis,  ecchymosis,  vitiligo. No acanthosis nigricans  NEURO:  Non-focal, normal DTRs; no tremor.  PSYCH: Alert and oriented -appropriate affect. Orientation, judgement and memory appear intact.  MSK: No joint abnormalities, FROM in all four extremities. No kyphosis    Assessment     1. Insulin controlled gestational diabetes mellitus (GDM) in third trimester        Plan     1. GESTATIONAL DIABETES-  Adjust dose as follows:    -Basaglar insulin 14   units. Increase by 2 units every 2 days to keep fasting blood glucose below 95mg/dL  -Novolog 0  units with breakfast  -Novolog 0 units with lunch   -Novolog 0 units with dinner  -Increase by 0 units every 2 days to keep 1 hour after meal blood glucose less than 140mg/dL    We reviewed glucose goals of fasting blood glucose <95 mg/dL and 1 hour post prandial blood glucose of <140 mg/dL.    Monitor  "blood sugar 4 times daily: Fasting  and 1 hour after each meal.  Contact  this clinic [phone] if blood glucose is not within the above-mentioned goals.     We discussed the importance of excellent glycemic control during pregnancy to limit complications such as fetal macrosomia, shoulder dystocia,  hypoglycemia and hyperbilirubinemia.  I have discussed the patient's increased risk of recurrent GDM and/or development of type 2 diabetes later in life.      I will see her back in 2 weeks if she doesn't deliver first.  Time spent with pt today: 25 min with >50% spent in counseling and coordination of care.          Jessika L Jeovany  2018      Lab Results     No results found for: HGBA1C, CREATININE, MICROALBUR    No results found for: CHOL, HDL, LDLCALC, TRIG    No results found for: ALT, AST, GGT, ALKPHOS, BILITOT      Current Medications     Outpatient Medications Prior to Visit   Medication Sig Dispense Refill     acetone, urine, test (ACETONE, URINE, TEST) Strp Use 1 each As Directed every morning. 50 strip 2     blood glucose test strips Use 1 each As Directed 4 (four) times a day. 150 strip 3     clindamycin (CLEOCIN T) 1 % lotion Apply topically.       docusate sodium (COLACE) 50 MG capsule Take 50 mg by mouth.       generic lancets Use 1 each As Directed 4 (four) times a day. 100 each 2     hydrocortisone 1 % cream Apply to inflamed skin 2-3 times daily until clear but not more than 2 weeks       insulin glargine (LANTUS; BASAGLAR) 100 unit/mL (3 mL) pen Inject 10 units under the skin at bedtime; increase as instructed; up to 40 units daily (Patient taking differently: Inject 14 units under the skin at bedtime; increase as instructed; up to 40 units daily) 5 adj dose pen 0     pen needle, diabetic (BD ULTRA-FINE JOIE PEN NEEDLE) 32 gauge x \" Ndle Use 1 each As Directed once daily. 100 each 0     PNV,calcium 72-iron-folic acid (PRENATAL PLUS, CALCIUM CARB,) 27 mg iron- 1 mg Tab Take by " mouth.       No facility-administered medications prior to visit.

## 2021-06-19 NOTE — PROGRESS NOTES
[facility] GDM Care Plan    Assessment: Pt seen today for 1 week f/u. She brings in detailed food and BG log. FBG are elevated 5/7 days. Pp readings well controlled with just one elevation after breakfast that was explainable. Pt is following GDM meal plan well. Ketones normal, small X1. She feels she is getting enough to eat. Pt is sometimes eating a snack at HS, sometimes not. If so, it is usually a sweet. Discussed having a protein at HS and to even try getting some activity before bed to try to help with the FBG. Will give it one more week. If FBG are still >95 will start insulin at f/u appt on Monday.       Visit Type: GDM Individual Follow-up  Time: 30  Visit #: 2  Provider: Liz Gann CNM   Provider's Diagnosis (per referral form): Gestational (648.83)  Weight: (!) 224 lb 9.6 oz (101.9 kg)  OGTT: No results found for this or any previous visit.   Estimated Date of Delivery: 9/18/18  Pregnancy #: 3  Previous GDM: Yes   Medications:   Current Outpatient Prescriptions:      acetone, urine, test (ACETONE, URINE, TEST) Strp, Use 1 each As Directed every morning., Disp: 50 strip, Rfl: 2     blood glucose test strips, Use 1 each As Directed 4 (four) times a day., Disp: 150 strip, Rfl: 3     generic lancets, Use 1 each As Directed 4 (four) times a day., Disp: 100 each, Rfl: 2    BG monitoring goals: Fasting <95; 1 hour post start of meal <140. Test 4 x per day.  Check fasting a.m. ketones: Yes  GDM meal pattern/carb counting taught per guidelines: Yes    Past Goals:  Nutrition: GDM meal plan MET  Activity: Walking after meals when able/staying active MET  Monitoring: BG 4x/day as directed, ketones every morning MET      New Goals:  Nutrition: GDM meal plan   Activity: Walking after meals when able/staying active   Monitoring: BG 4x/day as directed, ketones every morning    DIABETES CARE PLAN AND EDUCATION RECORD    Gestational Diabetes Disease Process/Preconception Care/Management During  Pregnancy/Postpartum:Discussed    Meter (per above goals): Assessed and Discussed    Nutrition Management    Weight: Assessed and Discussed  Portions/Balance: Assessed and Discussed  Carb ID/Count: Assessed and Discussed  Label Reading: Assessed and Discussed  Menu Planning: Assessed and Discussed  Dining Out: Assessed and Discussed  Physical Activity: Assessed and Discussed    Acute Complications: Prevent, Detect, Treat:    Goal Setting and Problem Solving: Assessed and Discussed  Barriers: Assessed and Discussed  Psychosocial Adjustments: Assessed and Discussed

## 2021-06-19 NOTE — PROGRESS NOTES
[facility] GDM Care Plan    Assessment: pt seen today for initial visit. She had GDM about 8 years ago, on insulin both at HS and with meals. Pt states she young and was not really following the diet back then. She was able to check BG in clinic w/o difficulty. BG was 96 fasting, however she has been up for a while. She typically eats 3 meals and snacks. She has already been watching her CHO. She works as a dental assistant.     Plan:Chekc BG and ketones as directed. GDM meal plan. F/u next week as scheduld     Provider: Liz Gann CNM   Provider's Diagnosis (per referral form): Gestational (648.83)  Weight: (!) 225 lb 11.2 oz (102.4 kg)  OGTT: No results found for this or any previous visit.  EDC: 9/18/18  Pregnancy #: 3  Previous GDM: Yes  Medications:   Current Outpatient Prescriptions:      acetone, urine, test (ACETONE, URINE, TEST) Strp, Use 1 each As Directed every morning., Disp: 50 strip, Rfl: 2     blood glucose test strips, Use 1 each As Directed 4 (four) times a day., Disp: 150 strip, Rfl: 3     generic lancets, Use 1 each As Directed 4 (four) times a day., Disp: 100 each, Rfl: 2    BG monitoring goals: Fasting <95; 1 hour post start of meal <140. Test 4 x per day.  Check fasting a.m. ketones: Yes  GDM meal pattern/carb counting taught per guidelines: Yes  Goals: Nutrition: GDM meal plan  Activity:  Walking after meals when able/staying active  Monitoring:  BG 4x/day as directed, ketones every morning    F/u in 1 week to assess BG and food log     DIABETES CARE PLAN AND EDUCATION RECORD    Gestational Diabetes Disease Process/Preconception Care/Management During Pregnancy/Postpartum:    Meter (per above goals): Discussed, Literature provided and Patient returned demonstration    Nutrition Management    Weight: Assessed, Discussed and Literature provided  Portions/Balance: Assessed, Discussed and Literature provided  Carb ID/Count: Assessed, Discussed and Literature provided  Label Reading: Assessed,  Discussed and Literature provided  Menu Planning: Assessed, Discussed and Literature provided  Dining Out: Assessed, Discussed and Literature provided  Physical Activity: Discussed and Literature provided    Acute Complications: Prevent, Detect, Treat:    Goal Setting and Problem Solving: Assessed and Discussed  Barriers: Assessed and Discussed  Psychosocial Adjustments: Assessed and Discussed      Time: 60  Visit Type: GDM Individual   Visit #: 1    I agree with the aforementioned diabetes plan.  Jessika DAWN [facility] Endocrinology  7/24/2018  11:46 AM

## 2021-06-20 NOTE — PROGRESS NOTES
Called an updated Stephanie Starkey CNM on negative ROM+, UA results, and . No new orders. BPP scheduled for 8PM tonight. Reactive strip. If BPP 8/8 okay to discharge pt home. If BPP <8/8 call Stephanie Starkey for further orders.

## 2021-06-20 NOTE — PROGRESS NOTES
[facility]  ENDOCRINOLOGY    Gestational Diabetes 2018    Mary Dudley, 1985, [id number]          Reason for visit      1. Insulin controlled gestational diabetes mellitus (GDM) in third trimester        HPI     Mary Dudley is a very pleasant 32 y.o. old female who presents for GESTATIONAL Diabetes Mellitus.  She is currently 37w4d  pregnant . Due date is 18  Diagnosed with GDM based on an OGTT. She hashad  GDM in prior pregnancies.   Current carbohydrate intake:consistent with recommendations of 30g-60g-60g.  I have reviewed her blood glucose logs and note that the:  Fasting readings  are:in range on current regimen  Postprandial readings are:in range on current regimen  Current Basaglar dose: 14 u  Current Prandial insulin: 0  Blood glucose logs/meter brought in and data reviewed and incorporated into decision-making.  [facility]  Therapy/Interventions in the past:  She has been seen by the Diabetes Educator- and has received instruction on carbohydrate counting and  consistency.  Records from referring provider and other sources have also been reviewed and incorporated into decision-making.      TODAY:  Mary returns today for her last appointment prior to delivery.  She will be induced next week.  She has brought in her meter, and the download shows that her numbers have been really good.  She did have one PP elevation, which was explainable.  Baby is moving appropriately and she is having no swelling.  Baby is in the 65th percentile.  Passing BPPs and NSTs with flying colors.  Postpartum instructions given today and all questions answered to her satisfaction.  She will call with questions or concerns.    Past Medical History       Patient Active Problem List   Diagnosis     Gestational diabetes        Past Surgical History     History reviewed. No pertinent surgical history.    Family History     History reviewed. No pertinent family history.    Social  History     Social History   Substance Use Topics     Smoking status: None     Smokeless tobacco: None     Alcohol use None       Review of Systems     Patient has no polyuria or polydipsia, no chest pain, dyspnea or TIA's, no numbness, tingling or pain in extremities  Remainder negative except as noted in HPI.      Vital Signs     /68 (Patient Site: Right Arm, Patient Position: Sitting, Cuff Size: Adult Regular)  Pulse 80  Wt (!) 229 lb (103.9 kg)  Wt Readings from Last 3 Encounters:   08/30/18 (!) 229 lb (103.9 kg)   08/16/18 (!) 226 lb 4.8 oz (102.6 kg)   08/06/18 (!) 224 lb 14.4 oz (102 kg)       Physical Exam     GENERAL: Pleasant, alert, appropriate appearance for age. No acute distress,   HEENT: Normocephalic, atraumatic  NECK: Supple, no masses or  lymph nodes.  THYROID: No nodules or enlargement. Non-tender, no bruit  CHEST/RESPIRATORY: Normal chest wall and respirations. Clear to auscultation.  CARDIOVASCULAR: Regular rate and rhythm. S1, S2, no murmur, click, gallop, or rubs.  ABDOMEN: Gravid   LYMPHATIC: No palpable nodes in neck, supraclavicular,  SKIN: No melanosis,  ecchymosis,  vitiligo. No acanthosis nigricans  NEURO:  Non-focal, normal DTRs; no tremor.  PSYCH: Alert and oriented -appropriate affect. Orientation, judgement and memory appear intact.  MSK: No joint abnormalities, FROM in all four extremities. No kyphosis    Assessment     1. Insulin controlled gestational diabetes mellitus (GDM) in third trimester        Plan     1. GESTATIONAL DIABETES-  Adjust dose as follows:    -Basaglar [id number]   units. Increase by 2 units every 2 days to keep fasting blood glucose below 95mg/dL  -Novolog 0  units with breakfast  -Novolog 0 units with lunch   -Novolog 0 units with dinner  -Increase by 0 units every 2 days to keep 1 hour after meal blood glucose less than 140mg/dL    We reviewed glucose goals of fasting blood glucose <95 mg/dL and 1 hour post prandial blood glucose of <140 mg/dL.     Monitor blood sugar 4 times daily: Fasting  and 1 hour after each meal.  Contact  this clinic [phone] if blood glucose is not within the above-mentioned goals.     We discussed the importance of excellent glycemic control during pregnancy to limit complications such as fetal macrosomia, shoulder dystocia,  hypoglycemia and hyperbilirubinemia.  I have discussed the patient's increased risk of recurrent GDM and/or development of type 2 diabetes later in life.        F/up with A1c 3 months postpartum with PCP.    May be a candidate for metformin postpartum as she has more than 1 risk factor for future Type 2 Diabetes Mellitus.    She will need a screening A1c at least annually, TLC- including carbohydrate control and exercise.    After you deliver the baby, it is suggested to check your blood sugar occasionally (1 - 2 times per week) for 6 weeks.     When you are not pregnant, normal blood sugars are:       Fasting (before eating anything in the morning)  - under 100 mg/dl    2 hours after meals under 140  The American Diabetes Association recommends:     a glucose tolerance test 6 weeks after you deliver the baby.         a hemoglobin Alc test yearly after delivery.  The Alc test is a 2-3 month average blood sugar reading.        Discuss getting these tests with your provider.       After delivery, and your provider has said that it is safe to be active, work toward getting 150 minutes each week of physical activity to decrease your risk of  getting type 2 diabetes.       Maintaining a healthy body weight will also decrease your risk of getting type 2 diabetes.           Jessika Thayer  2018        Lab Results     No results found for: HGBA1C, CREATININE, MICROALBUR    No results found for: CHOL, HDL, LDLCALC, TRIG    No results found for: ALT, AST, GGT, ALKPHOS, BILITOT      Current Medications     Outpatient Medications Prior to Visit   Medication Sig Dispense Refill     acetone, urine, test  "(ACETONE, URINE, TEST) Strp Use 1 each As Directed every morning. 50 strip 2     blood glucose test strips Use 1 each As Directed 4 (four) times a day. 150 strip 3     clindamycin (CLEOCIN T) 1 % lotion Apply topically.       docusate sodium (COLACE) 50 MG capsule Take 50 mg by mouth.       generic lancets Use 1 each As Directed 4 (four) times a day. 100 each 2     hydrocortisone 1 % cream Apply to inflamed skin 2-3 times daily until clear but not more than 2 weeks       insulin glargine (LANTUS; BASAGLAR) 100 unit/mL (3 mL) pen Inject 10 units under the skin at bedtime; increase as instructed; up to 40 units daily (Patient taking differently: Inject 14 units under the skin at bedtime; increase as instructed; up to 40 units daily) 5 adj dose pen 0     pen needle, diabetic (BD ULTRA-FINE JOIE PEN NEEDLE) 32 gauge x 5/32\" Ndle Use 1 each As Directed once daily. 100 each 0     PNV,calcium 72-iron-folic acid (PRENATAL PLUS, CALCIUM CARB,) 27 mg iron- 1 mg Tab Take by mouth.       No facility-administered medications prior to visit.        "

## 2021-06-20 NOTE — PROGRESS NOTES
Mary in for evaluation. BPP in clinic today 4/8 (0/2 for tone and 0/2 for movement per Stephanie Starkey CNM). NST reactive. Called and updated Stephanie Starkey CNM. Order for continuous fetal monitoring for 4 hours. Order BPP for 8:00PM can call Stephanie with results. Order for UA and ROM plus because Mary reports gush of clear/yellow fluid on Sunday. Mary reports no leaking since. IDGDM, POC x1 one hour after meal tonight. Call Stephanie with results of BPP.

## 2021-06-20 NOTE — PROGRESS NOTES
Called ultrasound tech for estimated time for BPP. Tech reported it would be about an hour until available to do BPP.

## 2021-06-20 NOTE — PROGRESS NOTES
BPP 8/8. Per Stephanie TOMASM pt okay to discharge. Mary verbalizes understanding of discharge instructions and when to call provider. Mary will follow up in clinic with regularly scheduled appointments.

## 2021-07-14 PROBLEM — Z34.90 PREGNANT: Status: RESOLVED | Noted: 2018-09-14 | Resolved: 2018-09-15

## 2021-07-14 PROBLEM — O24.419 GESTATIONAL DIABETES: Status: RESOLVED | Noted: 2018-08-07 | Resolved: 2018-09-15
